# Patient Record
Sex: FEMALE | Race: WHITE | NOT HISPANIC OR LATINO | URBAN - NONMETROPOLITAN AREA
[De-identification: names, ages, dates, MRNs, and addresses within clinical notes are randomized per-mention and may not be internally consistent; named-entity substitution may affect disease eponyms.]

---

## 2020-03-16 ENCOUNTER — APPOINTMENT (RX ONLY)
Dept: URBAN - NONMETROPOLITAN AREA CLINIC 22 | Facility: CLINIC | Age: 45
Setting detail: DERMATOLOGY
End: 2020-03-16

## 2020-03-16 DIAGNOSIS — L0293 CARBUNCLE AND FURUNCLE OF UNSPECIFIED SITE: ICD-10-CM

## 2020-03-16 DIAGNOSIS — L0292 CARBUNCLE AND FURUNCLE OF UNSPECIFIED SITE: ICD-10-CM

## 2020-03-16 PROBLEM — L08.9 LOCAL INFECTION OF THE SKIN AND SUBCUTANEOUS TISSUE, UNSPECIFIED: Status: ACTIVE | Noted: 2020-03-16

## 2020-03-16 PROCEDURE — ? TREATMENT REGIMEN

## 2020-03-16 PROCEDURE — ? COUNSELING

## 2020-03-16 PROCEDURE — ? PRESCRIPTION

## 2020-03-16 PROCEDURE — 99202 OFFICE O/P NEW SF 15 MIN: CPT

## 2020-03-16 RX ORDER — MUPIROCIN 20 MG/G
OINTMENT TOPICAL
Qty: 1 | Refills: 0 | Status: ERX | COMMUNITY
Start: 2020-03-16

## 2020-03-16 RX ADMIN — MUPIROCIN: 20 OINTMENT TOPICAL at 00:00

## 2020-03-16 ASSESSMENT — LOCATION SIMPLE DESCRIPTION DERM: LOCATION SIMPLE: RIGHT NOSE

## 2020-03-16 ASSESSMENT — LOCATION DETAILED DESCRIPTION DERM: LOCATION DETAILED: RIGHT NARIS

## 2020-03-16 ASSESSMENT — LOCATION ZONE DERM: LOCATION ZONE: NOSE

## 2020-03-16 NOTE — PROCEDURE: TREATMENT REGIMEN
Initiate Treatment: minocycline 100 mg capsule \\nSig: Take 1 PO daily x 7 days\\n\\nmupirocin 2 % topical ointment \\nDays Supply: 30\\nSig: Apply to both sides of the nose, and inside the belly button daily x 7 days
Detail Level: Zone

## 2020-06-29 ENCOUNTER — APPOINTMENT (RX ONLY)
Dept: URBAN - NONMETROPOLITAN AREA CLINIC 22 | Facility: CLINIC | Age: 45
Setting detail: DERMATOLOGY
End: 2020-06-29

## 2020-06-29 DIAGNOSIS — L0293 CARBUNCLE AND FURUNCLE OF UNSPECIFIED SITE: ICD-10-CM

## 2020-06-29 DIAGNOSIS — L0292 CARBUNCLE AND FURUNCLE OF UNSPECIFIED SITE: ICD-10-CM

## 2020-06-29 PROBLEM — L08.9 LOCAL INFECTION OF THE SKIN AND SUBCUTANEOUS TISSUE, UNSPECIFIED: Status: ACTIVE | Noted: 2020-06-29

## 2020-06-29 PROCEDURE — ? COUNSELING

## 2020-06-29 PROCEDURE — ? TREATMENT REGIMEN

## 2020-06-29 PROCEDURE — ? ADDITIONAL NOTES

## 2020-06-29 PROCEDURE — ? BLEACH BATH INSTRUCTIONS

## 2020-06-29 PROCEDURE — 99213 OFFICE O/P EST LOW 20 MIN: CPT

## 2020-06-29 ASSESSMENT — LOCATION SIMPLE DESCRIPTION DERM: LOCATION SIMPLE: ABDOMEN

## 2020-06-29 ASSESSMENT — LOCATION DETAILED DESCRIPTION DERM: LOCATION DETAILED: RIGHT RIB CAGE

## 2020-06-29 ASSESSMENT — LOCATION ZONE DERM: LOCATION ZONE: TRUNK

## 2020-06-29 NOTE — PROCEDURE: ADDITIONAL NOTES
Detail Level: Detailed
Additional Notes: Nothing fluctuant to drain on exam today. Erythema is retreating. She may be a carrier for Staph and discussed ways of treating carriage. Bleach bath, disinfecting home and car, washing clothes and bras, etc.

## 2020-06-29 NOTE — PROCEDURE: TREATMENT REGIMEN
Discontinue Regimen: minocycline 100 mg capsule \\nSig: Take 1 PO daily x 7 days
Plan: Patient was instructed to wash sheets and clothing prior to wearing them, or sleeping. Patient was instructed to disinfect all countertops, door knobs, etc in bathroom.
Otc Regimen: - Duoderm
Detail Level: Zone
Continue Regimen: Finish Antibiotics previously prescribed at urgent care and emergency room. \\n\\nmupirocin 2 % topical ointment \\nDays Supply: 30\\nSig: Apply to both sides of the nose, and inside the belly button daily x 7 days

## 2020-06-29 NOTE — PROCEDURE: BLEACH BATH INSTRUCTIONS
Detail Level: Detailed
[facility] Counseling: I recommended monthly bleach bath. This is accomplished by filling a standard bathtub with warm water and then adding in 2/3 cup (for garden tub) of bleach (Clorox) (3oz per gallon of water). The patient should then soak for 10 minutes and then rinse off with clean fresh water then apply medications and moisturizers.